# Patient Record
Sex: MALE | URBAN - NONMETROPOLITAN AREA
[De-identification: names, ages, dates, MRNs, and addresses within clinical notes are randomized per-mention and may not be internally consistent; named-entity substitution may affect disease eponyms.]

---

## 2024-07-22 ENCOUNTER — TELEPHONE (OUTPATIENT)
Dept: CARDIOLOGY CLINIC | Age: 54
End: 2024-07-22

## 2024-07-22 NOTE — TELEPHONE ENCOUNTER
Received referral from [facility].     Called patient and voicemail does not work.

## 2024-07-25 NOTE — TELEPHONE ENCOUNTER
Called pt.  A lady answered and said he wasn't available. She wrote our number down and will have him call us back.

## 2024-07-25 NOTE — TELEPHONE ENCOUNTER
Called pt's home# and I get a continuous fast busy.     Called pt's mobile #-LM for pt to return call.

## 2024-09-10 ENCOUNTER — OFFICE VISIT (OUTPATIENT)
Dept: CARDIOLOGY CLINIC | Age: 54
End: 2024-09-10
Payer: COMMERCIAL

## 2024-09-10 VITALS
SYSTOLIC BLOOD PRESSURE: 126 MMHG | WEIGHT: 266.4 LBS | BODY MASS INDEX: 37.3 KG/M2 | HEART RATE: 81 BPM | HEIGHT: 71 IN | DIASTOLIC BLOOD PRESSURE: 68 MMHG

## 2024-09-10 DIAGNOSIS — Z87.898 HISTORY OF CHEST PAIN: ICD-10-CM

## 2024-09-10 DIAGNOSIS — R06.09 DOE (DYSPNEA ON EXERTION): ICD-10-CM

## 2024-09-10 DIAGNOSIS — E66.9 OBESITY (BMI 30-39.9): ICD-10-CM

## 2024-09-10 DIAGNOSIS — E78.00 PURE HYPERCHOLESTEROLEMIA: Primary | ICD-10-CM

## 2024-09-10 PROCEDURE — 99204 OFFICE O/P NEW MOD 45 MIN: CPT | Performed by: INTERNAL MEDICINE

## 2024-09-10 RX ORDER — ATORVASTATIN CALCIUM 40 MG/1
40 TABLET, FILM COATED ORAL DAILY
COMMUNITY
Start: 2024-07-01 | End: 2024-12-28

## 2024-09-11 ENCOUNTER — TELEPHONE (OUTPATIENT)
Dept: CARDIOLOGY CLINIC | Age: 54
End: 2024-09-11

## 2024-09-30 ENCOUNTER — HOSPITAL ENCOUNTER (OUTPATIENT)
Age: 54
Discharge: HOME | End: 2024-10-02
Attending: INTERNAL MEDICINE
Payer: COMMERCIAL

## 2024-09-30 VITALS
DIASTOLIC BLOOD PRESSURE: 68 MMHG | BODY MASS INDEX: 37.24 KG/M2 | HEIGHT: 71 IN | WEIGHT: 266 LBS | SYSTOLIC BLOOD PRESSURE: 126 MMHG

## 2024-09-30 DIAGNOSIS — E66.9 OBESITY (BMI 30-39.9): ICD-10-CM

## 2024-09-30 DIAGNOSIS — Z87.898 HISTORY OF CHEST PAIN: ICD-10-CM

## 2024-09-30 DIAGNOSIS — E78.00 PURE HYPERCHOLESTEROLEMIA: ICD-10-CM

## 2024-09-30 DIAGNOSIS — R06.09 DOE (DYSPNEA ON EXERTION): ICD-10-CM

## 2024-09-30 LAB
ECHO AO ASC DIAM: 3.2 CM
ECHO AO ASCENDING AORTA INDEX: 1.34 CM/M2
ECHO AV CUSP MM: 1.9 CM
ECHO AV PEAK GRADIENT: 5 MMHG
ECHO AV PEAK VELOCITY: 1.1 M/S
ECHO AV VELOCITY RATIO: 0.64
ECHO BSA: 2.46 M2
ECHO EST RA PRESSURE: 10 MMHG
ECHO LA AREA 4C: 15.9 CM2
ECHO LA DIAMETER INDEX: 1.6 CM/M2
ECHO LA DIAMETER: 3.8 CM
ECHO LA MAJOR AXIS: 5 CM
ECHO LA VOL MOD A4C: 38 ML (ref 18–58)
ECHO LA VOLUME INDEX MOD A4C: 16 ML/M2 (ref 16–34)
ECHO LV E' LATERAL VELOCITY: 8.4 CM/S
ECHO LV E' SEPTAL VELOCITY: 6.5 CM/S
ECHO LV EF PHYSICIAN: 55 %
ECHO LV EJECTION FRACTION BIPLANE: 56 % (ref 55–100)
ECHO LV FRACTIONAL SHORTENING: 30 % (ref 28–44)
ECHO LV INTERNAL DIMENSION DIASTOLE INDEX: 1.97 CM/M2
ECHO LV INTERNAL DIMENSION DIASTOLIC: 4.7 CM (ref 4.2–5.9)
ECHO LV INTERNAL DIMENSION SYSTOLIC INDEX: 1.39 CM/M2
ECHO LV INTERNAL DIMENSION SYSTOLIC: 3.3 CM
ECHO LV ISOVOLUMETRIC RELAXATION TIME (IVRT): 106 MS
ECHO LV IVSD: 1.6 CM (ref 0.6–1)
ECHO LV MASS 2D: 251.4 G (ref 88–224)
ECHO LV MASS INDEX 2D: 105.6 G/M2 (ref 49–115)
ECHO LV POSTERIOR WALL DIASTOLIC: 1.1 CM (ref 0.6–1)
ECHO LV RELATIVE WALL THICKNESS RATIO: 0.47
ECHO LVOT PEAK GRADIENT: 2 MMHG
ECHO LVOT PEAK VELOCITY: 0.7 M/S
ECHO MV A VELOCITY: 0.62 M/S
ECHO MV E DECELERATION TIME (DT): 197 MS
ECHO MV E VELOCITY: 0.66 M/S
ECHO MV E/A RATIO: 1.06
ECHO MV E/E' LATERAL: 7.86
ECHO MV E/E' RATIO (AVERAGED): 9.01
ECHO MV E/E' SEPTAL: 10.15
ECHO MV REGURGITANT PEAK GRADIENT: 41 MMHG
ECHO MV REGURGITANT PEAK VELOCITY: 3.2 M/S
ECHO PULMONARY ARTERY END DIASTOLIC PRESSURE: 4 MMHG
ECHO PV MAX VELOCITY: 0.9 M/S
ECHO RIGHT VENTRICULAR SYSTOLIC PRESSURE (RVSP): 37 MMHG
ECHO RV INTERNAL DIMENSION: 3.7 CM
ECHO RV TAPSE: 2.5 CM (ref 1.7–?)
ECHO TV E WAVE: 0.6 M/S
ECHO TV REGURGITANT MAX VELOCITY: 2.59 M/S
ECHO TV REGURGITANT PEAK GRADIENT: 27 MMHG

## 2024-09-30 PROCEDURE — 93306 TTE W/DOPPLER COMPLETE: CPT | Performed by: INTERNAL MEDICINE

## 2024-09-30 PROCEDURE — 93306 TTE W/DOPPLER COMPLETE: CPT
